# Patient Record
Sex: FEMALE | Race: OTHER
[De-identification: names, ages, dates, MRNs, and addresses within clinical notes are randomized per-mention and may not be internally consistent; named-entity substitution may affect disease eponyms.]

---

## 2020-03-06 ENCOUNTER — HOSPITAL ENCOUNTER (OUTPATIENT)
Dept: HOSPITAL 53 - M RAD | Age: 35
End: 2020-03-06
Attending: NURSE PRACTITIONER
Payer: COMMERCIAL

## 2020-03-06 DIAGNOSIS — K42.9: Primary | ICD-10-CM

## 2020-03-06 DIAGNOSIS — N83.9: ICD-10-CM

## 2020-03-06 PROCEDURE — 74178 CT ABD&PLV WO CNTR FLWD CNTR: CPT

## 2020-03-06 NOTE — REPVR
PROCEDURE INFORMATION: 

Exam: CT Abdomen And Pelvis Without And With Contrast 

Exam date and time: 3/6/2020 2:13 PM 

Age: 34 years old 

Clinical indication: Abdominal pain; Localized; Lower; Additional info: 

Umbilical hernia 



TECHNIQUE: 

Imaging protocol: Computed tomography of the abdomen and pelvis without and 

with intravenous contrast. Coronal and sagittal reformats were created and 

reviewed. 

Radiation optimization: All CT scans at this facility use at least one of these 

dose optimization techniques: automated exposure control; mA and/or kV 

adjustment per patient size (includes targeted exams where dose is matched to 

clinical indication); or iterative reconstruction. 

Contrast material: ISOVUE 370; Contrast volume: 100 ml; Contrast route: IV;  



COMPARISON: 

No relevant prior studies available. 



FINDINGS: 

Lungs: The visualized lung bases are unremarkable. 



Liver: Unremarkable. 

Gallbladder and bile ducts: No intrahepatic or extrahepatic bile duct dilation. 

The gallbladder is unremarkable. 

Pancreas: Unremarkable. 

Spleen: Unremarkable. 

Adrenals: Unremarkable. 

Kidneys and ureters: The bilateral kidneys are unremarkable. No abnormal 

ureteral dilation. 

Stomach and bowel: The stomach is unremarkable. No evidence of colonic 

inflammation or obstruction. Oral contrast has progressed to the transverse 

colon. No evidence of small bowel inflammation or obstruction. 

Appendix: The appendix is visualized and has a normal appearance. 

Intraperitoneal space: Small intermediate-attenuation (29 Hounsfield units) 

free intraperitoneal dependent pelvic fluid. No pneumoperitoneum. 

Vasculature: The portal veins and hepatic veins appear patent. No abdominal 

aortic aneurysm. 

Lymph nodes: No enlarged lymph nodes. 



Bladder: The urinary bladder is unremarkable. 

Reproductive: Multiple small ovoid hypoattenuating foci at the cervix are 

nonspecific but presumably represent cervical cysts. No adnexal mass is 

identified. Right adnexal 1.8 x 1.9 x 1 cm ovoid mass with thick enhancing wall 

is nonspecific but could represent a physiologic right ovarian cyst (corpus 

luteal cyst) (for example coronal series 302, image 59). 

Bones/joints: No acute osseous abnormality. 

Soft tissues: There is midline anterior abdominal wall rectus diastasis at the 

supraumbilical and infraumbilical abdomen. A very small (approximately 5 mm) 

fat containing umbilical hernia is present. 



IMPRESSION: 

1. Nonspecific right adnexal 1.9 cm mass. This could represent a physiologic 

ovarian cyst. However, a pathologic adnexal mass is not excluded. Further 

evaluation with pelvic ultrasound is recommended. 

2. Nonspecific small free intraperitoneal fluid, possibly physiologic in 

nature. The fluid measures attenuation slightly greater than simple fluid and 

is suggestive of proteinaceous fluid. Hemorrhagic, infectious or inflammatory 

intraperitoneal fluid is not excludable. Clinical correlation is needed. 



Electronically signed by: Jonathan Pretty On 03/06/2020  15:09:09 PM

## 2020-04-16 ENCOUNTER — HOSPITAL ENCOUNTER (OUTPATIENT)
Dept: HOSPITAL 53 - M SFHCRHEU | Age: 35
End: 2020-04-16
Attending: INTERNAL MEDICINE
Payer: COMMERCIAL

## 2020-04-16 DIAGNOSIS — R76.8: Primary | ICD-10-CM

## 2020-04-16 DIAGNOSIS — R20.2: ICD-10-CM

## 2020-04-16 DIAGNOSIS — R79.89: ICD-10-CM

## 2020-08-05 ENCOUNTER — HOSPITAL ENCOUNTER (OUTPATIENT)
Dept: HOSPITAL 53 - M RAD | Age: 35
End: 2020-08-05
Attending: GENERAL PRACTICE
Payer: COMMERCIAL

## 2020-08-05 DIAGNOSIS — N88.8: ICD-10-CM

## 2020-08-05 DIAGNOSIS — D25.9: Primary | ICD-10-CM

## 2020-08-05 PROCEDURE — 72197 MRI PELVIS W/O & W/DYE: CPT

## 2020-09-21 NOTE — REP
MRI PELVIS WITH AND WITHOUT CONTRAST: 



HISTORY:  Leiomyomas. 



TECHNIQUE: Multiple sequences are obtained in the pelvis in the axial, coronary 
and sagittal planes prior to and following the intravenous administration of 12 
cc ProHance. 



FINDINGS:

Uterine length is approximately 10.3 cm. Endometrial thickness is approximately 
12 mm. The junctional zone appears intact. There is a posterior fibroid on the 
left in the region of the uterine body measuring 1.2 cm in diameter. No other 
discrete fibroids are seen. There are multiple small Nabothian cysts surrounding
the cervix. For the most part, these are subcentimeter in size with a couple 
slightly greater than 1 cm in diameter. There appears to be a  section 
scar in the right lower uterine segment. The ovaries appear unremarkable. There 
is a dominant follicle of the left ovary, 2.2 cm in diameter. Otherwise, no 
adnexal mass is seen. There is trace physiologic amount of fluid in the pelvis. 
There is no pelvic adenopathy. No other pelvic abnormality is seen.



IMPRESSION: 

There is a posterior left uterine fibroid in the body of the uterus measuring 
1.2 cm in diameter. No other discrete fibroids are seen. The uterine length is 
10.3 cm. Endometrial thickness is 12 mm. Multiple Nabothian cysts are seen in 
the region of the cervix. There is a  section scar in the right lower 
uterine segment. There is no adnexal mass or free fluid. 

MTDD

## 2021-02-03 ENCOUNTER — HOSPITAL ENCOUNTER (OUTPATIENT)
Dept: HOSPITAL 53 - M SFHCRHEU | Age: 36
End: 2021-02-03
Attending: INTERNAL MEDICINE
Payer: COMMERCIAL

## 2021-02-03 DIAGNOSIS — L50.8: Primary | ICD-10-CM

## 2021-02-03 LAB
CREAT UR-MCNC: 66.3 MG/DL
PROT UR-MCNC: 7.1 MG/DL (ref 0–12)

## 2021-02-03 PROCEDURE — 84156 ASSAY OF PROTEIN URINE: CPT

## 2021-02-03 PROCEDURE — 82570 ASSAY OF URINE CREATININE: CPT

## 2021-02-10 ENCOUNTER — HOSPITAL ENCOUNTER (OUTPATIENT)
Dept: HOSPITAL 53 - M LAB | Age: 36
End: 2021-02-10
Attending: INTERNAL MEDICINE
Payer: COMMERCIAL

## 2021-02-10 DIAGNOSIS — R21: Primary | ICD-10-CM

## 2021-02-10 LAB
ALBUMIN SERPL BCG-MCNC: 4.2 GM/DL (ref 3.2–5.2)
ALT SERPL W P-5'-P-CCNC: 47 U/L (ref 12–78)
BILIRUB SERPL-MCNC: 0.3 MG/DL (ref 0.2–1)
BUN SERPL-MCNC: 16 MG/DL (ref 7–18)
C3 SERPL-MCNC: 148 MG/DL (ref 90–180)
C4 SERPL-MCNC: 44 MG/DL (ref 10–40)
CALCIUM SERPL-MCNC: 9.7 MG/DL (ref 8.5–10.1)
CHLORIDE SERPL-SCNC: 101 MEQ/L (ref 98–107)
CO2 SERPL-SCNC: 27 MEQ/L (ref 21–32)
CREAT SERPL-MCNC: 0.82 MG/DL (ref 0.55–1.3)
CRP SERPL-MCNC: 1.04 MG/DL (ref 0–0.3)
GFR SERPL CREATININE-BSD FRML MDRD: > 60 ML/MIN/{1.73_M2} (ref 60–?)
GLUCOSE SERPL-MCNC: 86 MG/DL (ref 70–100)
POTASSIUM SERPL-SCNC: 4.1 MEQ/L (ref 3.5–5.1)
PROT SERPL-MCNC: 8.5 GM/DL (ref 6.4–8.2)
RHEUMATOID FACT SERPL-ACNC: < 10 IU/ML (ref ?–15)
SODIUM SERPL-SCNC: 137 MEQ/L (ref 136–145)
THYROPEROXIDASE AB SERPL IA-ACNC: > 1300 U/ML (ref ?–60)